# Patient Record
Sex: MALE | Race: WHITE | NOT HISPANIC OR LATINO | Employment: STUDENT
[De-identification: names, ages, dates, MRNs, and addresses within clinical notes are randomized per-mention and may not be internally consistent; named-entity substitution may affect disease eponyms.]

---
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
## 2023-04-12 ENCOUNTER — OFFICE VISIT (OUTPATIENT)
Dept: URGENT CARE | Facility: URGENT CARE | Age: 18
End: 2023-04-12
Payer: COMMERCIAL

## 2023-04-12 ENCOUNTER — ANCILLARY PROCEDURE (OUTPATIENT)
Dept: GENERAL RADIOLOGY | Facility: CLINIC | Age: 18
End: 2023-04-12
Attending: NURSE PRACTITIONER
Payer: COMMERCIAL

## 2023-04-12 VITALS
RESPIRATION RATE: 15 BRPM | TEMPERATURE: 98 F | BODY MASS INDEX: 23.62 KG/M2 | SYSTOLIC BLOOD PRESSURE: 120 MMHG | DIASTOLIC BLOOD PRESSURE: 82 MMHG | HEART RATE: 114 BPM | HEIGHT: 75 IN | WEIGHT: 190 LBS | OXYGEN SATURATION: 98 %

## 2023-04-12 DIAGNOSIS — S99.911A ANKLE INJURY, RIGHT, INITIAL ENCOUNTER: Primary | ICD-10-CM

## 2023-04-12 DIAGNOSIS — S99.911A ANKLE INJURY, RIGHT, INITIAL ENCOUNTER: ICD-10-CM

## 2023-04-12 PROBLEM — J34.2 DEVIATED NASAL SEPTUM: Status: ACTIVE | Noted: 2021-03-08

## 2023-04-12 PROBLEM — L70.0 ACNE VULGARIS: Status: ACTIVE | Noted: 2021-03-18

## 2023-04-12 PROCEDURE — 99213 OFFICE O/P EST LOW 20 MIN: CPT | Performed by: NURSE PRACTITIONER

## 2023-04-12 PROCEDURE — 73610 X-RAY EXAM OF ANKLE: CPT | Mod: TC | Performed by: RADIOLOGY

## 2023-04-12 NOTE — PATIENT INSTRUCTIONS
Ankle sprain without evidence of fracture  We will call if radiologist sees anything  Rest ice compression elevate  Ace wrap's in clinic as well as advised soft brace  Return to sports and activity as tolerated as long as duties do not elicit severe pain  Rotate Tylenol ibuprofen  Healing is typically 1-6 weeks

## 2023-04-12 NOTE — PROGRESS NOTES
"Chief Complaint   Patient presents with     Urgent Care     Musculoskeletal Problem     Playing basketball last night and someone landed on right ankle.      SUBJECTIVE:  Pierre Egan is a 17 year old male who presents to the clinic today with a basketball injury yesterday when another player fell onto his right ankle with lateral malleolus edema stiffness tenderness trouble bearing weight.  No pallor cool sensation numbness tingling pulselessness.  His range of motion is limited especially with ankle flexion.    No past medical history on file.  cetirizine (ZYRTEC) 10 MG tablet, Take 10 mg by mouth daily  ibuprofen (ADVIL/MOTRIN) 200 MG tablet, Take 200 mg by mouth    No current facility-administered medications on file prior to visit.    Social History     Tobacco Use     Smoking status: Not on file     Smokeless tobacco: Not on file   Substance Use Topics     Alcohol use: Not on file     Allergies   Allergen Reactions     Seasonal Allergies Other (See Comments)     Amoxicillin Hives and Rash     Morbilliform eruption   Morbilliform eruption     Morbilliform eruption   Morbilliform eruption     Morbilliform eruption          Review of Systems   All systems negative except for those listed above in HPI.    EXAM:   /82   Pulse 114   Temp 98  F (36.7  C) (Temporal)   Resp 15   Ht 1.905 m (6' 3\")   Wt 86.2 kg (190 lb)   SpO2 98%   BMI 23.75 kg/m      Physical Exam  Vitals reviewed.   Constitutional:       General: He is not in acute distress.     Appearance: Normal appearance. He is not ill-appearing, toxic-appearing or diaphoretic.   HENT:      Head: Normocephalic and atraumatic.      Nose: Nose normal.      Mouth/Throat:      Mouth: Mucous membranes are moist.      Pharynx: Oropharynx is clear.   Eyes:      Extraocular Movements: Extraocular movements intact.      Conjunctiva/sclera: Conjunctivae normal.      Pupils: Pupils are equal, round, and reactive to light.   Cardiovascular:      Rate and " Rhythm: Normal rate.      Pulses: Normal pulses.   Pulmonary:      Effort: Pulmonary effort is normal.   Musculoskeletal:         General: Swelling, tenderness and signs of injury present. No deformity.      Cervical back: Normal range of motion and neck supple.   Skin:     General: Skin is warm and dry.      Findings: Rash present. No bruising or erythema.   Neurological:      General: No focal deficit present.      Mental Status: He is alert and oriented to person, place, and time.   Psychiatric:         Mood and Affect: Mood normal.         Behavior: Behavior normal.       X-ray done in clinic read by me as negative for fracture.    ASSESSMENT:    ICD-10-CM    1. Ankle injury, right, initial encounter  S99.911A XR Ankle Right G/E 3 Views        PLAN:    Ankle sprain without evidence of fracture  We will call if radiologist sees anything  Rest ice compression elevate  Ace wrap's in clinic as well as advised soft brace  Return to sports and activity as tolerated as long as duties do not elicit severe pain  Rotate Tylenol ibuprofen  Healing is typically 1-6 weeks    Follow up with primary care provider with any problems, questions or concerns or if symptoms worsen or fail to improve. Patient agreed to plan and verbalized understanding.    Teresa Godwin, ERIC-[facility] [place]

## 2023-10-22 ENCOUNTER — HEALTH MAINTENANCE LETTER (OUTPATIENT)
Age: 18
End: 2023-10-22

## 2024-12-02 ENCOUNTER — OFFICE VISIT (OUTPATIENT)
Dept: URGENT CARE | Facility: URGENT CARE | Age: 19
End: 2024-12-02

## 2024-12-02 VITALS
HEIGHT: 75 IN | HEART RATE: 60 BPM | OXYGEN SATURATION: 100 % | TEMPERATURE: 97.9 F | DIASTOLIC BLOOD PRESSURE: 72 MMHG | BODY MASS INDEX: 26.73 KG/M2 | WEIGHT: 215 LBS | SYSTOLIC BLOOD PRESSURE: 129 MMHG | RESPIRATION RATE: 16 BRPM

## 2024-12-02 DIAGNOSIS — H10.33 ACUTE CONJUNCTIVITIS OF BOTH EYES, UNSPECIFIED ACUTE CONJUNCTIVITIS TYPE: ICD-10-CM

## 2024-12-02 DIAGNOSIS — R07.0 THROAT PAIN: ICD-10-CM

## 2024-12-02 DIAGNOSIS — J01.90 ACUTE NON-RECURRENT SINUSITIS, UNSPECIFIED LOCATION: Primary | ICD-10-CM

## 2024-12-02 LAB
DEPRECATED S PYO AG THROAT QL EIA: NEGATIVE
GROUP A STREP BY PCR: NOT DETECTED

## 2024-12-02 PROCEDURE — 99213 OFFICE O/P EST LOW 20 MIN: CPT | Performed by: PHYSICIAN ASSISTANT

## 2024-12-02 PROCEDURE — 87651 STREP A DNA AMP PROBE: CPT | Performed by: PHYSICIAN ASSISTANT

## 2024-12-02 RX ORDER — GUAIFENESIN 600 MG/1
1200 TABLET, EXTENDED RELEASE ORAL 2 TIMES DAILY
Qty: 28 TABLET | Refills: 0 | Status: SHIPPED | OUTPATIENT
Start: 2024-12-02 | End: 2024-12-09

## 2024-12-02 RX ORDER — DOXYCYCLINE 100 MG/1
100 CAPSULE ORAL 2 TIMES DAILY
Qty: 14 CAPSULE | Refills: 0 | Status: SHIPPED | OUTPATIENT
Start: 2024-12-02 | End: 2024-12-09

## 2024-12-02 RX ORDER — POLYMYXIN B SULFATE AND TRIMETHOPRIM 1; 10000 MG/ML; [USP'U]/ML
1-2 SOLUTION OPHTHALMIC EVERY 4 HOURS
Qty: 10 ML | Refills: 0 | Status: SHIPPED | OUTPATIENT
Start: 2024-12-02 | End: 2024-12-09

## 2024-12-02 NOTE — PROGRESS NOTES
"SUBJECTIVE:  Chief Complaint:   Chief Complaint   Patient presents with    Eye Problem     X5 days of redness in eyes, eyes crusted in the morning     Pharyngitis     This morning woke up with sore throat      History of Present Illness:  Pierre Egan is a 19 year old male who presents complaining of moderate both eyes redness for 1 week(s).   Onset/timing: started in left eye, migrated to left eye. Discahrge in mornings only.    Associated Signs and Symptoms: today woke up with sore throat. Sinuses are a persistent issue, maybe worse the last few days  Treatment measures tried include: flonase and antihistamine  Contact wearer : No    No past medical history on file.  Current Outpatient Medications   Medication Sig Dispense Refill    doxycycline hyclate (VIBRAMYCIN) 100 MG capsule Take 1 capsule (100 mg) by mouth 2 times daily for 7 days. 14 capsule 0    guaiFENesin (MUCINEX) 600 MG 12 hr tablet Take 2 tablets (1,200 mg) by mouth 2 times daily for 7 days. 28 tablet 0    ibuprofen (ADVIL/MOTRIN) 200 MG tablet Take 200 mg by mouth as needed.      polymixin b-trimethoprim (POLYTRIM) 93221-0.1 UNIT/ML-% ophthalmic solution Place 1-2 drops into both eyes every 4 hours for 7 days. 10 mL 0    cetirizine (ZYRTEC) 10 MG tablet Take 10 mg by mouth daily          ROS:  Review of systems negative except as stated above.    OBJECTIVE:  /72   Pulse 60   Temp 97.9  F (36.6  C) (Temporal)   Resp 16   Ht 1.905 m (6' 3\")   Wt 97.5 kg (215 lb)   SpO2 100%   BMI 26.87 kg/m    General: no acute distress  Eye exam: right eye abnormal findings: erythema, left eye abnormal findings: erythema.  Ears: normal canals, TMs bilaterally, normal TM mobility  Neck: supple, non-tender, free range of motion, no adenopathy  Heart: NORMAL - regular rate and rhythm without murmur.  Lungs: normal and clear to auscultation  \      Results for orders placed or performed in visit on 12/02/24   Streptococcus A Rapid Screen w/Reflex to PCR - " Clinic Collect     Status: Normal    Specimen: Throat; Swab   Result Value Ref Range    Group A Strep antigen Negative Negative         ASSESSMENT:  (J01.90) Acute non-recurrent sinusitis, unspecified location  (primary encounter diagnosis)  Plan: doxycycline hyclate (VIBRAMYCIN) 100 MG         capsule, guaiFENesin (MUCINEX) 600 MG 12 hr         tablet    Hold AB for 48 hours, dedicated sinus treatment. If not improving start AB    (R07.0) Throat pain  Plan: Streptococcus A Rapid Screen w/Reflex to PCR -         Clinic Collect, Group A Streptococcus PCR         Throat Swab      (H10.33) Acute conjunctivitis of both eyes, unspecified acute conjunctivitis type  Plan: polymixin b-trimethoprim (POLYTRIM) 98397-9.1         UNIT/ML-% ophthalmic solution    Follow up with PCP if symptoms worsen or fail to improve

## 2024-12-04 ENCOUNTER — TELEPHONE (OUTPATIENT)
Dept: FAMILY MEDICINE | Facility: CLINIC | Age: 19
End: 2024-12-04

## 2024-12-04 NOTE — TELEPHONE ENCOUNTER
Spoke with pt and he has the rx in hand from Mahad Valencia PA-C, he states he is feeling better and will hold off on taking the antibiotics for now  Wanda Wells, CMA

## 2024-12-04 NOTE — TELEPHONE ENCOUNTER
Symptoms    Describe your symptoms: Sore throat is getting worse    Any pain: Yes:     How long have you been having symptoms: 3  days    Have you been seen for this:  Yes: Mucinex and Polytrim    Appointment offered?: No    Triage offered?: No    Home remedies tried: Noted above. Red eye sympoms are better but throat is getting worse and would like to try the Vibramycin as discussed but Mariann does not have this and would like a call back to discuss. Thank you.    Preferred Pharmacy:   [id number] - [place], [place] - [address] AT [facility] [address]  [address]  [place] [address]  Phone: [phone] Fax: [phone]      Could we send this information to you in [employer] or would you prefer to receive a phone call?:   Patient would prefer a phone call   Okay to leave a detailed message?: Yes at Home number on file [phone] (home)

## 2025-06-29 ENCOUNTER — HEALTH MAINTENANCE LETTER (OUTPATIENT)
Age: 20
End: 2025-06-29